# Patient Record
Sex: MALE | Race: WHITE
[De-identification: names, ages, dates, MRNs, and addresses within clinical notes are randomized per-mention and may not be internally consistent; named-entity substitution may affect disease eponyms.]

---

## 2018-08-06 ENCOUNTER — HOSPITAL ENCOUNTER (EMERGENCY)
Dept: HOSPITAL 25 - UCCORT | Age: 63
Discharge: HOME | End: 2018-08-06
Payer: COMMERCIAL

## 2018-08-06 VITALS — SYSTOLIC BLOOD PRESSURE: 139 MMHG | DIASTOLIC BLOOD PRESSURE: 79 MMHG

## 2018-08-06 DIAGNOSIS — J98.01: ICD-10-CM

## 2018-08-06 DIAGNOSIS — Z87.891: ICD-10-CM

## 2018-08-06 DIAGNOSIS — J32.9: Primary | ICD-10-CM

## 2018-08-06 PROCEDURE — G0463 HOSPITAL OUTPT CLINIC VISIT: HCPCS

## 2018-08-06 PROCEDURE — 99202 OFFICE O/P NEW SF 15 MIN: CPT

## 2018-08-06 NOTE — UC
General HPI





- HPI Summary


HPI Summary: 





pt presents c/o a "sinus infection" for a week. he describes this as sinus 

pressure with congestion that won't come out and a cough that feels like "a 

liquid" that he can not raise. pt is also c/o a 3/10 headache. denies HA being 

abrupt or worst, has had worse HA's in the past. self tx with allegra d and 

tylenol plus asa but the sinus and lung symptoms continue. denies hx asthma/

copd. no associated cp. + subjective fever but not wheezy or sob.





- History of Current Complaint


Chief Complaint: UCRespiratory


Stated Complaint: SINUSES,HEADACHE,COUGH


Time Seen by Provider: 08/06/18 08:49


Hx Obtained From: Patient


Onset/Duration: Gradual Onset


Pain Intensity: 3


Aggravating: deep breaths make him cough


Associated Signs & Symptoms: Positive: Cough, Fever, Headache.  Negative: Chest 

Pain, SOB, Wheezing





- Allergy/Home Medications


Allergies/Adverse Reactions: 


 Allergies











Allergy/AdvReac Type Severity Reaction Status Date / Time


 


No Known Allergies Allergy   Verified 08/06/18 08:46











Home Medications: 


 Home Medications





Fexofenadine (NF) [Allegra (NF)] 120 mg PO ONCE PRN 08/06/18 [History Confirmed 

08/06/18]











PMH/Surg Hx/FS Hx/Imm Hx


Previously Healthy: Yes





- Surgical History


Surgical History: Yes


Surgery Procedure, Year, and Place: PROSTATECTOMY





- Family History


Known Family History: Positive: None





- Social History


Occupation: Employed Full-time


Alcohol Use: Occasionally


Substance Use Type: None


Smoking Status (MU): Former Smoker


When Did the Patient Quit Smoking/Using Tobacco: 20 years ago





- Immunization History


Vaccination Up to Date: Yes





Review of Systems


Constitutional: Fever - subjective


Skin: Negative


Eyes: Negative


ENT: Sinus Congestion, Sinus Pain/Tenderness


Respiratory: Cough


Cardiovascular: Negative


Gastrointestinal: Negative


Genitourinary: Negative


Motor: Negative


Neurovascular: Negative


Musculoskeletal: Negative


Neurological: Headache


Psychological: Negative


Is Patient Immunocompromised?: No


All Other Systems Reviewed And Are Negative: Yes





Physical Exam


Triage Information Reviewed: Yes


Appearance: Well-Appearing


Vital Signs: 


 Initial Vital Signs











Temp  98.9 F   08/06/18 08:47


 


Pulse  85   08/06/18 08:47


 


Resp  17   08/06/18 08:47


 


BP  139/79   08/06/18 08:47


 


Pulse Ox  96   08/06/18 08:47











Vital Signs Reviewed: Yes


Eyes: Positive: Conjunctiva Clear


ENT: Positive: Pharynx normal, Nasal congestion, TMs normal, Sinus tenderness.  

Negative: Nasal drainage


Neck: Positive: Supple, Nontender, No Lymphadenopathy


Respiratory: Positive: Lungs clear, Decreased breath sounds, Other: - Frequent 

NPC


Cardiovascular: Positive: RRR, No Murmur


Abdomen Description: Positive: Nontender, No Organomegaly, Soft


Bowel Sounds: Positive: Present


Musculoskeletal: Positive: ROM Intact


Neurological: Positive: Alert, Other: - CN 2-12 grossly intact. s/v/m intact 

x4. steady gait.


Psychological: Positive: Age Appropriate Behavior


Skin Exam: Normal





Re-Evaluation





- Re-Evaluation


  ** First Eval


Change: Improved - much better aeration and less cough post albuterol. pt notes 

breathing seemed easier as well.





Course/Dx





- Course


Course Of Treatment: pt is non toxic. no an abrupt or worst ha. ill x 1 week 

and not improving with otc tx plus has subjective fever thus will cover with 

doxycycline for sinuses and lung coverage.





- Differential Dx - Multi-Symptom


Provider Diagnoses: Sinusitis. Acute cough. Bronchospasm.





Discharge





- Sign-Out/Discharge


Documenting (check all that apply): Patient Departure





- Discharge Plan


Condition: Improved


Disposition: HOME


Prescriptions: 


Albuterol HFA INHALER* [Ventolin HFA Inhaler*] 2 puff INH Q6H #1 mdi


DOXYcycline CAP(*) [DOXYcycline 100MG CAP(*)] 100 mg PO BID #20 cap


predniSONE TAB* [Deltasone 20 MG TAB*] 40 mg PO DAILY 3 Days #6 tab


Patient Education Materials:  Sinusitis (ED), Bronchospasm (ED)


Referrals: 


Russel Bersntein MD [Primary Care Provider] - 





- Billing Disposition and Condition


Condition: IMPROVED


Disposition: Home